# Patient Record
Sex: MALE | Race: WHITE | Employment: FULL TIME
[De-identification: names, ages, dates, MRNs, and addresses within clinical notes are randomized per-mention and may not be internally consistent; named-entity substitution may affect disease eponyms.]

---

## 2024-03-08 ENCOUNTER — OFFICE VISIT (OUTPATIENT)
Facility: CLINIC | Age: 29
End: 2024-03-08
Payer: COMMERCIAL

## 2024-03-08 ENCOUNTER — HOSPITAL ENCOUNTER (OUTPATIENT)
Facility: HOSPITAL | Age: 29
Setting detail: SPECIMEN
End: 2024-03-08
Payer: COMMERCIAL

## 2024-03-08 VITALS
OXYGEN SATURATION: 99 % | WEIGHT: 152.8 LBS | SYSTOLIC BLOOD PRESSURE: 98 MMHG | HEART RATE: 74 BPM | HEIGHT: 67 IN | TEMPERATURE: 97.7 F | DIASTOLIC BLOOD PRESSURE: 65 MMHG | RESPIRATION RATE: 15 BRPM | BODY MASS INDEX: 23.98 KG/M2

## 2024-03-08 DIAGNOSIS — Z76.89 ENCOUNTER TO ESTABLISH CARE: Primary | ICD-10-CM

## 2024-03-08 DIAGNOSIS — Z13.220 SCREENING CHOLESTEROL LEVEL: ICD-10-CM

## 2024-03-08 DIAGNOSIS — K58.2 IRRITABLE BOWEL SYNDROME WITH BOTH CONSTIPATION AND DIARRHEA: ICD-10-CM

## 2024-03-08 DIAGNOSIS — G44.009 CLUSTER HEADACHE, NOT INTRACTABLE, UNSPECIFIED CHRONICITY PATTERN: ICD-10-CM

## 2024-03-08 DIAGNOSIS — K21.9 GASTROESOPHAGEAL REFLUX DISEASE WITHOUT ESOPHAGITIS: ICD-10-CM

## 2024-03-08 DIAGNOSIS — Z11.59 ENCOUNTER FOR HEPATITIS C SCREENING TEST FOR LOW RISK PATIENT: ICD-10-CM

## 2024-03-08 DIAGNOSIS — Z11.3 ROUTINE SCREENING FOR STI (SEXUALLY TRANSMITTED INFECTION): ICD-10-CM

## 2024-03-08 DIAGNOSIS — Z13.1 DIABETES MELLITUS SCREENING: ICD-10-CM

## 2024-03-08 DIAGNOSIS — Z23 NEED FOR VACCINATION: ICD-10-CM

## 2024-03-08 DIAGNOSIS — Z76.89 ENCOUNTER TO ESTABLISH CARE: ICD-10-CM

## 2024-03-08 LAB
ALBUMIN SERPL-MCNC: 4.1 G/DL (ref 3.4–5)
ALBUMIN/GLOB SERPL: 1.1 (ref 0.8–1.7)
ALP SERPL-CCNC: 60 U/L (ref 45–117)
ALT SERPL-CCNC: 20 U/L (ref 16–61)
ANION GAP SERPL CALC-SCNC: 4 MMOL/L (ref 3–18)
AST SERPL-CCNC: 10 U/L (ref 10–38)
BASOPHILS # BLD: 0 K/UL (ref 0–0.1)
BASOPHILS NFR BLD: 1 % (ref 0–2)
BILIRUB SERPL-MCNC: 0.6 MG/DL (ref 0.2–1)
BUN SERPL-MCNC: 8 MG/DL (ref 7–18)
BUN/CREAT SERPL: 9 (ref 12–20)
CALCIUM SERPL-MCNC: 9.7 MG/DL (ref 8.5–10.1)
CHLORIDE SERPL-SCNC: 109 MMOL/L (ref 100–111)
CHOLEST SERPL-MCNC: 193 MG/DL
CO2 SERPL-SCNC: 29 MMOL/L (ref 21–32)
CREAT SERPL-MCNC: 0.89 MG/DL (ref 0.6–1.3)
DIFFERENTIAL METHOD BLD: ABNORMAL
EOSINOPHIL # BLD: 0.2 K/UL (ref 0–0.4)
EOSINOPHIL NFR BLD: 4 % (ref 0–5)
ERYTHROCYTE [DISTWIDTH] IN BLOOD BY AUTOMATED COUNT: 12.8 % (ref 11.6–14.5)
GLOBULIN SER CALC-MCNC: 3.7 G/DL (ref 2–4)
GLUCOSE SERPL-MCNC: 94 MG/DL (ref 74–99)
HBA1C MFR BLD: 4.9 % (ref 4.2–5.6)
HCT VFR BLD AUTO: 48.6 % (ref 36–48)
HDLC SERPL-MCNC: 66 MG/DL (ref 40–60)
HDLC SERPL: 2.9 (ref 0–5)
HGB BLD-MCNC: 15.6 G/DL (ref 13–16)
IMM GRANULOCYTES # BLD AUTO: 0 K/UL (ref 0–0.04)
IMM GRANULOCYTES NFR BLD AUTO: 0 % (ref 0–0.5)
LDLC SERPL CALC-MCNC: 108.6 MG/DL (ref 0–100)
LIPID PANEL: ABNORMAL
LYMPHOCYTES # BLD: 2.3 K/UL (ref 0.9–3.6)
LYMPHOCYTES NFR BLD: 39 % (ref 21–52)
MCH RBC QN AUTO: 30.8 PG (ref 24–34)
MCHC RBC AUTO-ENTMCNC: 32.1 G/DL (ref 31–37)
MCV RBC AUTO: 95.9 FL (ref 78–100)
MONOCYTES # BLD: 0.4 K/UL (ref 0.05–1.2)
MONOCYTES NFR BLD: 6 % (ref 3–10)
NEUTS SEG # BLD: 3 K/UL (ref 1.8–8)
NEUTS SEG NFR BLD: 51 % (ref 40–73)
NRBC # BLD: 0 K/UL (ref 0–0.01)
NRBC BLD-RTO: 0 PER 100 WBC
PLATELET # BLD AUTO: 308 K/UL (ref 135–420)
PMV BLD AUTO: 10.8 FL (ref 9.2–11.8)
POTASSIUM SERPL-SCNC: 4.2 MMOL/L (ref 3.5–5.5)
PROT SERPL-MCNC: 7.8 G/DL (ref 6.4–8.2)
RBC # BLD AUTO: 5.07 M/UL (ref 4.35–5.65)
RPR SER QL: NONREACTIVE
SODIUM SERPL-SCNC: 142 MMOL/L (ref 136–145)
TRIGL SERPL-MCNC: 92 MG/DL
VLDLC SERPL CALC-MCNC: 18.4 MG/DL
WBC # BLD AUTO: 6 K/UL (ref 4.6–13.2)

## 2024-03-08 PROCEDURE — 85025 COMPLETE CBC W/AUTO DIFF WBC: CPT

## 2024-03-08 PROCEDURE — 80061 LIPID PANEL: CPT

## 2024-03-08 PROCEDURE — 87491 CHLMYD TRACH DNA AMP PROBE: CPT

## 2024-03-08 PROCEDURE — 90471 IMMUNIZATION ADMIN: CPT | Performed by: STUDENT IN AN ORGANIZED HEALTH CARE EDUCATION/TRAINING PROGRAM

## 2024-03-08 PROCEDURE — 99204 OFFICE O/P NEW MOD 45 MIN: CPT | Performed by: STUDENT IN AN ORGANIZED HEALTH CARE EDUCATION/TRAINING PROGRAM

## 2024-03-08 PROCEDURE — 36415 COLL VENOUS BLD VENIPUNCTURE: CPT

## 2024-03-08 PROCEDURE — 87591 N.GONORRHOEAE DNA AMP PROB: CPT

## 2024-03-08 PROCEDURE — 90715 TDAP VACCINE 7 YRS/> IM: CPT | Performed by: STUDENT IN AN ORGANIZED HEALTH CARE EDUCATION/TRAINING PROGRAM

## 2024-03-08 PROCEDURE — 87661 TRICHOMONAS VAGINALIS AMPLIF: CPT

## 2024-03-08 PROCEDURE — 80053 COMPREHEN METABOLIC PANEL: CPT

## 2024-03-08 PROCEDURE — 86803 HEPATITIS C AB TEST: CPT

## 2024-03-08 PROCEDURE — 83036 HEMOGLOBIN GLYCOSYLATED A1C: CPT

## 2024-03-08 PROCEDURE — 87389 HIV-1 AG W/HIV-1&-2 AB AG IA: CPT

## 2024-03-08 PROCEDURE — 86592 SYPHILIS TEST NON-TREP QUAL: CPT

## 2024-03-08 RX ORDER — FAMOTIDINE 20 MG/1
20 TABLET, FILM COATED ORAL 2 TIMES DAILY
Qty: 60 TABLET | Refills: 2 | Status: SHIPPED | OUTPATIENT
Start: 2024-03-08

## 2024-03-08 RX ORDER — OMEPRAZOLE 40 MG/1
40 CAPSULE, DELAYED RELEASE ORAL
Qty: 30 CAPSULE | Refills: 3 | Status: SHIPPED | OUTPATIENT
Start: 2024-03-08

## 2024-03-08 SDOH — ECONOMIC STABILITY: FOOD INSECURITY: WITHIN THE PAST 12 MONTHS, YOU WORRIED THAT YOUR FOOD WOULD RUN OUT BEFORE YOU GOT MONEY TO BUY MORE.: NEVER TRUE

## 2024-03-08 SDOH — ECONOMIC STABILITY: INCOME INSECURITY: HOW HARD IS IT FOR YOU TO PAY FOR THE VERY BASICS LIKE FOOD, HOUSING, MEDICAL CARE, AND HEATING?: NOT HARD AT ALL

## 2024-03-08 SDOH — ECONOMIC STABILITY: HOUSING INSECURITY
IN THE LAST 12 MONTHS, WAS THERE A TIME WHEN YOU DID NOT HAVE A STEADY PLACE TO SLEEP OR SLEPT IN A SHELTER (INCLUDING NOW)?: NO

## 2024-03-08 SDOH — ECONOMIC STABILITY: FOOD INSECURITY: WITHIN THE PAST 12 MONTHS, THE FOOD YOU BOUGHT JUST DIDN'T LAST AND YOU DIDN'T HAVE MONEY TO GET MORE.: NEVER TRUE

## 2024-03-08 ASSESSMENT — ENCOUNTER SYMPTOMS
VOMITING: 0
ABDOMINAL PAIN: 0
NAUSEA: 0
SHORTNESS OF BREATH: 0

## 2024-03-08 ASSESSMENT — PATIENT HEALTH QUESTIONNAIRE - PHQ9
2. FEELING DOWN, DEPRESSED OR HOPELESS: 0
SUM OF ALL RESPONSES TO PHQ QUESTIONS 1-9: 0
1. LITTLE INTEREST OR PLEASURE IN DOING THINGS: 0
SUM OF ALL RESPONSES TO PHQ QUESTIONS 1-9: 0
SUM OF ALL RESPONSES TO PHQ9 QUESTIONS 1 & 2: 0
SUM OF ALL RESPONSES TO PHQ QUESTIONS 1-9: 0
SUM OF ALL RESPONSES TO PHQ QUESTIONS 1-9: 0

## 2024-03-08 NOTE — PROGRESS NOTES
History of Present Illness  Brian Bunn is a 28 y.o. male who presents today for management of    Chief Complaint   Patient presents with    New Patient     CC: new patient here for healthcare maintenance    -Patient is here to establish care.   -Previous PCP: none  -Works as   -He lives at home with wife and son   -Pt reports good support system with family/friends and feels safe at home.    HA:  -reports sharp pain behind eye, occurs on one side of the face  -watery eye and congestion on that side  -comes and goes  -started 8 years ago  -HA lasts max of 5 hrs  -Sometimes daily, or every other day  -aspirin helps somewhat   -frequency hs increased over the years    Heartburn:  -Tums stopped working  -Now taking pepcid which helps somewhat    Healthcare maintenance:  Immunizations:  Flu vacc: overdue  TDaP vacc: overdue  COVID vacc: overdue for booster  Sexual health: Pt would like STI screening  Mood: Reports good mood overall      3/8/2024    11:08 AM   PHQ-9    Little interest or pleasure in doing things 0   Feeling down, depressed, or hopeless 0   PHQ-2 Score 0   PHQ-9 Total Score 0     Past Medical History  No past medical history on file.   Surgical History  No past surgical history on file.   Current Medications  No current outpatient medications on file.     No current facility-administered medications for this visit.     Allergies/Drug Reactions  No Known Allergies   Family History  Family History   Problem Relation Age of Onset    Hypertension Father     Epilepsy Brother     Diabetes Maternal Grandfather       Social History  Social History     Tobacco Use    Smoking status: Never    Smokeless tobacco: Never   Vaping Use    Vaping Use: Some days    Substances: THC    Devices: Disposable   Substance Use Topics    Alcohol use: Yes     Comment: occasionaly    Drug use: Never      Health Maintenance   Topic Date Due    COVID-19 Vaccine (1) Never done    Depression Screen  Never done    HIV

## 2024-03-08 NOTE — PROGRESS NOTES
Brian Bunn is a 28 y.o. year old male who presents today for   Chief Complaint   Patient presents with    New Patient       Is someone accompanying this pt? no    Is the patient using any DME equipment during OV? no    Depression Screening:       3/8/2024    11:08 AM   PHQ-9 Questionaire   Little interest or pleasure in doing things 0   Feeling down, depressed, or hopeless 0   PHQ-9 Total Score 0       Abuse Screening:      3/8/2024    11:00 AM   AMB Abuse Screening   Do you ever feel afraid of your partner? N   Are you in a relationship with someone who physically or mentally threatens you? N   Is it safe for you to go home? Y       Learning Assessment:  No question data found.    Fall Risk:       No data to display                    Coordination of Care:   1. \"Have you been to the ER, urgent care clinic since your last visit?  Hospitalized since your last visit?\" no    2. \"Have you seen or consulted any other health care providers outside of the [facility] since your last visit?\" no    3. For patients aged 45-75: Has the patient had a colonoscopy / FIT/ Cologuard? no    If the patient is female:    4. For patients aged 40-74: Has the patient had a mammogram within the past 2 years? no    5. For patients aged 21-65: Has the patient had a pap smear? no    Health Maintenance: reviewed and discussed and ordered per Provider.    Health Maintenance Due   Topic Date Due    COVID-19 Vaccine (1) Never done    Depression Screen  Never done    HIV screen  Never done    Hepatitis C screen  Never done    DTaP/Tdap/Td vaccine (7 - Td or Tdap) 12/13/2015    Flu vaccine (1) 08/01/2023        - Osmar Posadas/MA  [facility]  Phone: [phone]  Fax: [phone]

## 2024-03-11 LAB
C TRACH RRNA SPEC QL NAA+PROBE: NEGATIVE
HCV AB SER IA-ACNC: 0.04 INDEX
HCV AB SERPL QL IA: NEGATIVE
HEPATITIS C COMMENT: NORMAL
HIV 1+2 AB+HIV1 P24 AG SERPL QL IA: NONREACTIVE
HIV 1/2 RESULT COMMENT: NORMAL
N GONORRHOEA RRNA SPEC QL NAA+PROBE: NEGATIVE
SPECIMEN SOURCE: NORMAL
T VAGINALIS RRNA SPEC QL NAA+PROBE: NEGATIVE

## 2024-03-18 ENCOUNTER — OFFICE VISIT (OUTPATIENT)
Facility: CLINIC | Age: 29
End: 2024-03-18
Payer: COMMERCIAL

## 2024-03-18 VITALS
SYSTOLIC BLOOD PRESSURE: 105 MMHG | BODY MASS INDEX: 24.14 KG/M2 | WEIGHT: 153.8 LBS | OXYGEN SATURATION: 98 % | RESPIRATION RATE: 15 BRPM | DIASTOLIC BLOOD PRESSURE: 72 MMHG | HEART RATE: 76 BPM | HEIGHT: 67 IN | TEMPERATURE: 97.8 F

## 2024-03-18 DIAGNOSIS — S83.104S DISLOCATION OF RIGHT KNEE, SEQUELA: Primary | ICD-10-CM

## 2024-03-18 PROCEDURE — 99213 OFFICE O/P EST LOW 20 MIN: CPT | Performed by: STUDENT IN AN ORGANIZED HEALTH CARE EDUCATION/TRAINING PROGRAM

## 2024-03-18 ASSESSMENT — PATIENT HEALTH QUESTIONNAIRE - PHQ9
SUM OF ALL RESPONSES TO PHQ9 QUESTIONS 1 & 2: 0
2. FEELING DOWN, DEPRESSED OR HOPELESS: NOT AT ALL
SUM OF ALL RESPONSES TO PHQ QUESTIONS 1-9: 0
1. LITTLE INTEREST OR PLEASURE IN DOING THINGS: NOT AT ALL
SUM OF ALL RESPONSES TO PHQ QUESTIONS 1-9: 0

## 2024-03-18 ASSESSMENT — ENCOUNTER SYMPTOMS: SHORTNESS OF BREATH: 0

## 2024-03-18 NOTE — PROGRESS NOTES
Brian Bunn is a 28 y.o. year old male who presents today for   Chief Complaint   Patient presents with    Follow-up       Is someone accompanying this pt? no    Is the patient using any DME equipment during OV? no    Depression Screening:       3/18/2024     9:49 AM 3/8/2024    11:08 AM   PHQ-9 Questionaire   Little interest or pleasure in doing things 0 0   Feeling down, depressed, or hopeless 0 0   PHQ-9 Total Score 0 0       Abuse Screening:      3/8/2024    11:00 AM   AMB Abuse Screening   Do you ever feel afraid of your partner? N   Are you in a relationship with someone who physically or mentally threatens you? N   Is it safe for you to go home? Y       Learning Assessment:  No question data found.    Fall Risk:       No data to display                    Coordination of Care:   1. \"Have you been to the ER, urgent care clinic since your last visit?  Hospitalized since your last visit?\" yes    2. \"Have you seen or consulted any other health care providers outside of the [facility] since your last visit?\" no    3. For patients aged 45-75: Has the patient had a colonoscopy / FIT/ Cologuard? no    If the patient is female:    4. For patients aged 40-74: Has the patient had a mammogram within the past 2 years? no    5. For patients aged 21-65: Has the patient had a pap smear? no    Health Maintenance: reviewed and discussed and ordered per Provider.    Health Maintenance Due   Topic Date Due    Flu vaccine (1) 08/01/2023    COVID-19 Vaccine (3 - 2023-24 season) 09/01/2023        - Osmar Posadas/MA  [facility]  Phone: [phone]  Fax: [phone]

## 2024-03-18 NOTE — PROGRESS NOTES
History of Present Illness  Brian Bunn is a 28 y.o. male who presents today for management of    Chief Complaint   Patient presents with    Follow-up         -pt reports R knee patellar dislocation on 3/8/24  -went to ED, dislocation reduced, pt placed in knee immobilizer, told to follow up with Ortho in 1-2 weeks  -pt reports reduction in swelling and decreased ROM (however this has slowly improved)      There is no problem list on file for this patient.     Past Medical History:   Diagnosis Date    Headache     Irritable bowel syndrome       No past surgical history on file.   Family History   Problem Relation Age of Onset    Hypertension Father     Epilepsy Brother     Diabetes Maternal Grandfather      Social History     Socioeconomic History    Marital status:      Spouse name: Not on file    Number of children: Not on file    Years of education: Not on file    Highest education level: Not on file   Occupational History    Not on file   Tobacco Use    Smoking status: Never    Smokeless tobacco: Never   Vaping Use    Vaping Use: Some days    Substances: THC    Devices: Disposable   Substance and Sexual Activity    Alcohol use: Yes     Comment: occasionaly    Drug use: Never    Sexual activity: Yes     Partners: Female   Other Topics Concern    Not on file   Social History Narrative    Not on file     Social Determinants of Health     Financial Resource Strain: Low Risk  (3/8/2024)    Overall Financial Resource Strain (CARDIA)     Difficulty of Paying Living Expenses: Not hard at all   Food Insecurity: No Food Insecurity (3/8/2024)    Hunger Vital Sign     Worried About Running Out of Food in the Last Year: Never true     Ran Out of Food in the Last Year: Never true   Transportation Needs: Unknown (3/8/2024)    PRAPARE - Transportation     Lack of Transportation (Medical): Not on file     Lack of Transportation (Non-Medical): No   Physical Activity: Not on file   Stress: Not on file   Social

## 2024-04-08 ENCOUNTER — HOSPITAL ENCOUNTER (OUTPATIENT)
Facility: HOSPITAL | Age: 29
Setting detail: RECURRING SERIES
Discharge: HOME | End: 2024-04-11
Payer: COMMERCIAL

## 2024-04-08 PROCEDURE — 97016 VASOPNEUMATIC DEVICE THERAPY: CPT

## 2024-04-08 PROCEDURE — 97110 THERAPEUTIC EXERCISES: CPT

## 2024-04-08 PROCEDURE — 97161 PT EVAL LOW COMPLEX 20 MIN: CPT

## 2024-04-08 NOTE — PROGRESS NOTES
[facility]  [address] [place] [id number] Phone: [phone] Fax   Plan of Care / Statement of Necessity for Physical Therapy Services     Patient Name: Brian Bunn : 1995   Medical   Diagnosis: *Right knee pain [M25.561] Treatment Diagnosis: M25.561  RIGHT KNEE PAIN     Onset Date: 2024 Payor:  Payor: NICHOLAS / Plan: [facility] [place] / Product Type: *No Product type* /    Referral Source: Sarabjit Emery MD Start of Care (SOC): 2024   Prior Hospitalization: See medical history Provider #: [id number]   Prior Level of Function: Functional I, no hx of any ortho or PMH    Comorbidities: See assessment section below      Assessment / key information:  Pt is a 28 y.o. year old male who presents with co R medial knee pain and swelling starting 2024 after dislocation while dancing on 2024. Patient wears brace when in the community. Past treatments/ imaging have included visit to ER , relocated patella and referred to ortho .  Pain levels range from 2/10 to 7/10 and managed with OTC ibuprofen and icing as needed.  Current social situation include lives in 2 story home with wife and 1 yo son  . PMH insignificant  Reported functional deficits include: descending stairs, work duties- getting into truck, heavy lifting walking down incline, rec activities- riding bike, golf , marital arts Positive objective assessment as follows:   Gait:  antalgic    ROM :  Knee 1 - 120 (painful full extension and end range flexion)    Strength :  Hip: flexion 3, ABD 4 extension 4  Knee: flexion 4, extension 3, quad lag +    Flexibility:   Hamstrings:  90/90 HS test + R   Quadriceps: Bronwyn Test +  100 deg   Gastroc:   tightness reported     Palpation: medial knee tenderness     Patella:  Patellar Mobility hypo        Girth Measurements:     Cm at joint line   Left 36   Right  38     Balance:  SLS - 30 sec B, 3/10 pain and increased trunk movement R LE

## 2024-04-08 NOTE — PROGRESS NOTES
PT KNEE / HIP EVAL AND TREATMENT    Patient Name: Brian Bunn  Date:2024  : 1995  [x]  Patient  Verified  Payor: NICHOLAS / Plan: [facility] [place] / Product Type: *No Product type* /    In time:910  Out time:946  Total Treatment Time (min): 36  Total Timed Codes (min): 8  1:1 Treatment Time ( only): 26   Visit #: 1 of     Treatment Area: Right knee pain [M25.561]    SUBJECTIVE  Pain Level (0-10 scale): (C): 3 (B): 2 (W):  7  Any medication changes, allergies to medications, diagnosis change, or new procedure performed: see summary sheet for update  Subjective functional status/changes:  CHIEF COMPLAINT: Patient presents with co R medial knee pain and swelling starting 2024 after dislocation while dancing on 2024. Patient wears brace when in the community. Past treatments/ imaging have included visit to ER , relocated patella and referred to ortho .  Pain levels range from 2/10 to 7/10 and managed with OTC ibuprofen and icing as needed.  Current social situation include lives in 2 story home with wife and 3 yo son  . PMH insignificant  Reported functional deficits include: descending stairs, work duties- getting into truck, heavy lifting walking down incline, rec activities- riding bike, golf , [employer] arts     OBJECTIVE  Posture: WNL   Gait:  antalgic    ROM :  Hip WNL   Knee 1 - 120 (painful full extension and end range flexion)  Ankle WNL     Strength :  Hip: flexion 3, ABD 4 extension 4  Knee: flexion 4, extension 3, quad lag +    Flexibility:   Hamstrings:  90/90 HS test + R   Quadriceps: Bronwyn Test +  100 deg   Gastroc:   tightness reported     Palpation: medial knee tenderness     Patella:  Patellar Positioning (Static) WNL   Patellar Tracking WNL      Patellar Mobility hypo        Girth Measurements:     Cm at joint line   Left 36   Right  38     Balance:  SLS - 30 sec B, 3/10 pain and increased trunk movement R LE             Other tests/comments:  Squat : 45 deg knee and hip bend

## 2024-04-11 ENCOUNTER — HOSPITAL ENCOUNTER (OUTPATIENT)
Facility: HOSPITAL | Age: 29
Setting detail: RECURRING SERIES
Discharge: HOME | End: 2024-04-14
Payer: COMMERCIAL

## 2024-04-11 PROCEDURE — 97112 NEUROMUSCULAR REEDUCATION: CPT

## 2024-04-11 PROCEDURE — 97110 THERAPEUTIC EXERCISES: CPT

## 2024-04-11 PROCEDURE — 97140 MANUAL THERAPY 1/> REGIONS: CPT

## 2024-04-11 PROCEDURE — 97016 VASOPNEUMATIC DEVICE THERAPY: CPT

## 2024-04-11 NOTE — PROGRESS NOTES
deficits, analyze and address strength deficits, analyze and address soft tissue restrictions, analyze and cue for proper movement patterns, analyze and modify for postural abnormalities, analyze and address imbalance/dizziness, and instruct in home and community integration to address functional deficits and attain remaining goals.    Progress toward goals / Updated goals:  []  See Progress Note/Recertification    Short Term Goals: To be accomplished in  1  weeks:  1.  Pt will be independent and compliant with HEP  Status at last assessment :HEP est at initial eval and will be progressed as needed.   Current: notes compliance (4/11/24)  Long Term Goals: To be accomplished in  8-12  treatments:  1.  Patient will increase FOTO score to 71/100 for indications of increased functional mobility.   Status at IE 40  2.  Patient will demo AROM knee flexion to 125 for improved joint mobility with transfers   Status at   Current; addressing with pain-free joint mobs and PROm flexion with light PT OP (4/11/24)  3.  Patient will demo negative quad lag with SLR for improved quad recruitment to decrease antalgic gait   Status at IE +  Current: addressing with quad setting in WB and NWB positions (4/11/24)  4.  Patient will demo 4/5 knee extension strength for improved stability with SL ADLs    Status at IE 3    Next PN/ RC due 5/6/24  Auth due (visit number/ date) ROSEANN    PLAN  - Continue Plan of Care  - Upgrade activities as tolerated    Betsy England PT    4/11/2024    8:30 AM    Future Appointments   Date Time Provider Department Center   4/11/2024  9:00 AM Betsy England, PT [facility]   4/17/2024  2:00 PM [facility]   4/19/2024 11:40 AM Sarah Humphreys, PT [facility]   4/24/2024  9:00 AM [facility]   4/26/2024 12:20 PM Sarah Humphreys, PT [facility]   5/7/2024  9:00 AM Benedicto Nunes, NUPUR [facility]   5/8/2024 10:00 AM Peggy Solis MD [facility]   5/10/2024  9:00 AM Benedicto Nunes,

## 2024-04-17 ENCOUNTER — HOSPITAL ENCOUNTER (OUTPATIENT)
Facility: HOSPITAL | Age: 29
Setting detail: RECURRING SERIES
Discharge: HOME | End: 2024-04-20
Payer: COMMERCIAL

## 2024-04-17 PROCEDURE — 97140 MANUAL THERAPY 1/> REGIONS: CPT

## 2024-04-17 PROCEDURE — 97110 THERAPEUTIC EXERCISES: CPT

## 2024-04-17 PROCEDURE — 97112 NEUROMUSCULAR REEDUCATION: CPT

## 2024-04-17 PROCEDURE — 97016 VASOPNEUMATIC DEVICE THERAPY: CPT

## 2024-04-17 NOTE — PROGRESS NOTES
PHYSICAL / OCCUPATIONAL THERAPY - DAILY TREATMENT NOTE    Patient Name: Brian Bunn    Date: 2024    : 1995  Insurance: Payor: NICHOLAS / Plan: [facility] / Product Type: *No Product type* /      Patient  verified Yes     Visit #   Current / Total 3 8-12   Time   In / Out 2:00 3:10   Pain   In / Out 1 0   Subjective Functional Status/Changes: Pt reports his knee is doing better overall.      TREATMENT AREA =  Right knee pain [M25.561]    OBJECTIVE    Modalities Rationale:     decrease edema, decrease inflammation, and decrease pain to improve patient's ability to progress to PLOF and address remaining functional goals.    10 min [x]  Vasopneumatic Device, press/temp: Low/low   If using vaso (only need to measure limb vaso being performed on)      pre-treatment girth : 37.5 cm (with pants donned)      post-treatment girth : 36 cm      measured at (landmark location) :   mid patella    Skin assessment post-treatment:   Intact      Therapeutic Procedures:    Tx Min Billable or 1:1 Min (if diff from Tx Min) Procedure, Rationale, Specifics   25  54642 Therapeutic Exercise (timed):  increase ROM, strength, coordination, balance, and proprioception to improve patient's ability to progress to PLOF and address remaining functional goals. (see flow sheet as applicable)     Details if applicable:  exercises and ROM measurements   20  38640 Neuromuscular Re-Education (timed):  improve balance, coordination, kinesthetic sense, posture, core stability and proprioception to improve patient's ability to develop conscious control of individual muscles and awareness of position of extremities in order to progress to PLOF and address remaining functional goals. (see flow sheet as applicable)     Details if applicable:     15  56232 Manual Therapy (timed):  decrease pain and increase ROM to improve patient's ability to progress to PLOF and address remaining functional goals.  The manual therapy interventions were

## 2024-04-19 ENCOUNTER — HOSPITAL ENCOUNTER (OUTPATIENT)
Facility: HOSPITAL | Age: 29
Setting detail: RECURRING SERIES
Discharge: HOME | End: 2024-04-22
Payer: COMMERCIAL

## 2024-04-19 PROCEDURE — 97016 VASOPNEUMATIC DEVICE THERAPY: CPT

## 2024-04-19 PROCEDURE — 97140 MANUAL THERAPY 1/> REGIONS: CPT

## 2024-04-19 PROCEDURE — 97110 THERAPEUTIC EXERCISES: CPT

## 2024-04-19 PROCEDURE — 97112 NEUROMUSCULAR REEDUCATION: CPT

## 2024-04-19 NOTE — PROGRESS NOTES
PHYSICAL / OCCUPATIONAL THERAPY - DAILY TREATMENT NOTE    Patient Name: Brian Bunn    Date: 2024    : 1995  Insurance: Payor: NICHOLAS / Plan: [facility] [place] / Product Type: *No Product type* /      Patient  verified Yes     Visit #   Current / Total 4 8-12   Time   In / Out 11:40 12:35   Pain   In / Out 0 0   Subjective Functional Status/Changes: \"The stuff I'm doing here and at home is making a big difference.\"     TREATMENT AREA =  Right knee pain [M25.561]    OBJECTIVE    Modalities Rationale:     decrease edema, decrease inflammation, and decrease pain to improve patient's ability to progress to PLOF and address remaining functional goals.    10 min [x]  Vasopneumatic Device, press/temp: Low/low   If using vaso (only need to measure limb vaso being performed on)      pre-treatment girth : 37.5 cm (with pants donned)      post-treatment girth : 37.4 cm      measured at (landmark location) :   mid patella    Skin assessment post-treatment:   Intact      Therapeutic Procedures:    Tx Min Billable or 1:1 Min (if diff from Tx Min) Procedure, Rationale, Specifics   17 17 55265 Therapeutic Exercise (timed):  increase ROM, strength, coordination, balance, and proprioception to improve patient's ability to progress to PLOF and address remaining functional goals. (see flow sheet as applicable)     Details if applicable:  right knee strengthening and ROM     20 20 41958 Neuromuscular Re-Education (timed):  improve balance, coordination, kinesthetic sense, posture, core stability and proprioception to improve patient's ability to develop conscious control of individual muscles and awareness of position of extremities in order to progress to PLOF and address remaining functional goals. (see flow sheet as applicable)     Details if applicable:  right quad re-ed     8 8 38974 Manual Therapy (timed):  decrease pain and increase ROM to improve patient's ability to progress to PLOF and address remaining

## 2024-04-24 ENCOUNTER — HOSPITAL ENCOUNTER (OUTPATIENT)
Facility: HOSPITAL | Age: 29
Setting detail: RECURRING SERIES
Discharge: HOME | End: 2024-04-27
Payer: COMMERCIAL

## 2024-04-24 PROCEDURE — 97112 NEUROMUSCULAR REEDUCATION: CPT

## 2024-04-24 PROCEDURE — 97110 THERAPEUTIC EXERCISES: CPT

## 2024-04-24 PROCEDURE — 97016 VASOPNEUMATIC DEVICE THERAPY: CPT

## 2024-04-25 ENCOUNTER — HOSPITAL ENCOUNTER (OUTPATIENT)
Facility: HOSPITAL | Age: 29
Setting detail: RECURRING SERIES
Discharge: HOME | End: 2024-04-28
Payer: COMMERCIAL

## 2024-04-25 PROCEDURE — 97110 THERAPEUTIC EXERCISES: CPT

## 2024-04-25 PROCEDURE — 97140 MANUAL THERAPY 1/> REGIONS: CPT

## 2024-04-25 PROCEDURE — 97112 NEUROMUSCULAR REEDUCATION: CPT

## 2024-04-25 PROCEDURE — 97016 VASOPNEUMATIC DEVICE THERAPY: CPT

## 2024-04-25 NOTE — PROGRESS NOTES
PHYSICAL / OCCUPATIONAL THERAPY - DAILY TREATMENT NOTE    Patient Name: Brian Bunn    Date: 2024    : 1995  Insurance: Payor: NICHOLAS / Plan: [facility] [place] / Product Type: *No Product type* /      Patient  verified Yes     Visit #   Current / Total 6 8-12   Time   In / Out 7:40 am 8:37 am   Pain   In / Out 0/10 0/10   Subjective Functional Status/Changes: Patient reports he has been able to walk more normally. Patient states he has continued pain after driving and when      TREATMENT AREA =  Right knee pain [M25.561]    OBJECTIVE    Modalities Rationale:     decrease edema, decrease inflammation, and decrease pain to improve patient's ability to progress to PLOF and address remaining functional goals.    10 min [x]  Vasopneumatic Device, press/temp: Low/low   If using vaso (only need to measure limb vaso being performed on)      pre-treatment girth: 38 cm (with pants donned)      post-treatment girth: 38 cm (with pants donned)      measured at (landmark location): mid patella    Skin assessment post-treatment:   Intact      Therapeutic Procedures:    Tx Min Billable or 1:1 Min (if diff from Tx Min) Procedure, Rationale, Specifics   21  53519 Therapeutic Exercise (timed):  increase ROM, strength, coordination, balance, and proprioception to improve patient's ability to progress to PLOF and address remaining functional goals. (see flow sheet as applicable)     Details if applicable:       18  44536 Neuromuscular Re-Education (timed):  improve balance, coordination, kinesthetic sense, posture, core stability and proprioception to improve patient's ability to develop conscious control of individual muscles and awareness of position of extremities in order to progress to PLOF and address remaining functional goals. (see flow sheet as applicable)     Details if applicable: modified quadriceps set with SLR to quadriceps set only     8  70440 Manual Therapy (timed):  increase ROM and increase tissue

## 2024-04-26 ENCOUNTER — APPOINTMENT (OUTPATIENT)
Facility: HOSPITAL | Age: 29
End: 2024-04-26
Payer: COMMERCIAL

## 2024-04-29 ENCOUNTER — APPOINTMENT (OUTPATIENT)
Facility: HOSPITAL | Age: 29
End: 2024-04-29
Payer: COMMERCIAL

## 2024-05-07 ENCOUNTER — HOSPITAL ENCOUNTER (OUTPATIENT)
Facility: HOSPITAL | Age: 29
Setting detail: RECURRING SERIES
Discharge: HOME | End: 2024-05-10
Payer: COMMERCIAL

## 2024-05-07 PROCEDURE — 97110 THERAPEUTIC EXERCISES: CPT

## 2024-05-07 PROCEDURE — 97140 MANUAL THERAPY 1/> REGIONS: CPT

## 2024-05-07 PROCEDURE — 97112 NEUROMUSCULAR REEDUCATION: CPT

## 2024-05-07 NOTE — PROGRESS NOTES
[facility] [place]   [address] [place] [address]  Phone: [phone] Fax: [phone]  PROGRESS NOTE  Patient Name: Brian Bunn : 1995   Treatment/Medical Diagnosis: Right knee pain [M25.561]   Referral Source:  Sarabjit Staton MD  Payor: NICHOLAS / Plan: [facility] [place] / Product Type: *No Product type* /      Date of Initial Visit: 24 Attended Visits: 7 Missed Visits: 1     SUMMARY OF TREATMENT  Patient is a 28 year old male who presents with c/o (R) medial knee pain and swelling starting 2024 after dislocation while dancing on 2024. Patient wears brace when in the community. Treatment has consisted of the followin Therapeutic Exercise, 00251 Neuromuscular Re-Education, 54686 Manual Therapy, 55158 Therapeutic Activity, 21513 Self Care/Home Management, 60016 Vasopneumatic Device.    CURRENT STATUS  Patient has attended 7 our of 8 sessions from 24-24, making good progress at this time. Objectively, patient demonstrates increased knee mobility and strength likely contributing to improvement in gait quality, squatting, and stair negotiation. Assessment as follows:    FOTO score: 63/100  Subjective improvement of 85% in symptoms since IE reported.  Improvements: improved ability to bend the right knee, able to straighten the right knee fully, moving furniture, stairs, squatting  Deficits: dancing, kneeling (patellar pain), lateral movement, increased time when having to do certain movements such as when standing from a half-kneeling position     ROM:     Hip WNL   Knee 0 - 140 (painful end range flexion)  Ankle WNL      Strength :  Hip: flexion 4+/5, ABD 4/5, extension 4/5  Knee: flexion 4/5, extension 4/5  Right distal thigh circumference 1.2 cm decreased in comparison to left distal thigh circumference, measured at one inch above base of patella     Flexibility:   Hamstrings:  90/90 HS test + minimally (R) 
totals to left)  8-22 min = 1 unit; 23-37 min = 2 units; 38-52 min = 3 units; 53-67 min = 4 units; 68-82 min = 5 units   Total Total     [x]  Patient Education billed concurrently with other procedures   [] Review HEP    [] Progressed/Changed HEP, detail:    [] Other detail:       Objective Information/Functional Measures/Assessment    FOTO score: 63/100  Subjective improvement of 85% in symptoms since IE reported.  Improvements: improved ability to bend the right knee, able to straighten the right knee fully, moving furniture, stairs, squatting  Deficits: dancing, kneeling (patellar pain), lateral movement, increased time when having to do certain movements such as when standing from a half-kneeling position    ROM:    Hip WNL   Knee 0 - 140 (painful end range flexion)  Ankle WNL      Strength :  Hip: flexion 4+/5, ABD 4/5, extension 4/5  Knee: flexion 4/5, extension 4/5  Right distal thigh circumference 1.2 cm decreased in comparison to left distal thigh circumference, measured at one inch above base of patella     Flexibility:   Hamstrings:  90/90 HS test + minimally (R)   Quadriceps: Bronwyn Test +  110 deg   Gastroc: 8 degrees bilaterally     Palpation: medial knee tenderness      Patella:  Patellar Positioning (Static) WNL   Patellar Tracking mildly lateral      Patellar Mobility mildly hypomobile        Balance:  SLS - 30 sec B            Other tests/comments:  Squat: 110 deg knee and hip bend     Patient will continue to benefit from skilled PT / OT services to modify and progress therapeutic interventions, analyze and address functional mobility deficits, analyze and address ROM deficits, analyze and address strength deficits, analyze and address soft tissue restrictions, analyze and cue for proper movement patterns, analyze and modify for postural abnormalities, analyze and address imbalance/dizziness, and instruct in home and community integration to address functional deficits and attain remaining

## 2024-05-08 ENCOUNTER — OFFICE VISIT (OUTPATIENT)
Facility: CLINIC | Age: 29
End: 2024-05-08
Payer: COMMERCIAL

## 2024-05-08 VITALS
RESPIRATION RATE: 12 BRPM | SYSTOLIC BLOOD PRESSURE: 119 MMHG | TEMPERATURE: 98.1 F | WEIGHT: 149 LBS | HEART RATE: 67 BPM | HEIGHT: 67 IN | DIASTOLIC BLOOD PRESSURE: 70 MMHG | BODY MASS INDEX: 23.39 KG/M2 | OXYGEN SATURATION: 98 %

## 2024-05-08 DIAGNOSIS — G44.009 CLUSTER HEADACHE, NOT INTRACTABLE, UNSPECIFIED CHRONICITY PATTERN: ICD-10-CM

## 2024-05-08 DIAGNOSIS — S83.104S DISLOCATION OF RIGHT KNEE, SEQUELA: ICD-10-CM

## 2024-05-08 DIAGNOSIS — K21.9 GASTROESOPHAGEAL REFLUX DISEASE WITHOUT ESOPHAGITIS: Primary | ICD-10-CM

## 2024-05-08 PROCEDURE — 99214 OFFICE O/P EST MOD 30 MIN: CPT | Performed by: STUDENT IN AN ORGANIZED HEALTH CARE EDUCATION/TRAINING PROGRAM

## 2024-05-08 RX ORDER — FAMOTIDINE 20 MG/1
20 TABLET, FILM COATED ORAL 2 TIMES DAILY
Qty: 180 TABLET | Refills: 2 | Status: SHIPPED | OUTPATIENT
Start: 2024-05-08

## 2024-05-08 RX ORDER — OMEPRAZOLE 40 MG/1
40 CAPSULE, DELAYED RELEASE ORAL
Qty: 90 CAPSULE | Refills: 2 | Status: SHIPPED | OUTPATIENT
Start: 2024-05-08

## 2024-05-08 ASSESSMENT — ENCOUNTER SYMPTOMS
SHORTNESS OF BREATH: 0
ABDOMINAL PAIN: 0

## 2024-05-08 NOTE — PROGRESS NOTES
History of Present Illness  Brian Bunn is a 28 y.o. male who presents today for management of    Chief Complaint   Patient presents with    Gastroesophageal Reflux    Headache         -pt overall stable since last visit  -reports improved pain and ROM of R leg s/p patellar dislocation, last scheduled PT on Friday   -headaches have not been a problem recently  -reflux has improved on PPI and famotidine       There is no problem list on file for this patient.     Past Medical History:   Diagnosis Date    Headache     Irritable bowel syndrome       No past surgical history on file.   Family History   Problem Relation Age of Onset    Hypertension Father     Epilepsy Brother     Diabetes Maternal Grandfather      Social History     Socioeconomic History    Marital status:      Spouse name: Not on file    Number of children: Not on file    Years of education: Not on file    Highest education level: Not on file   Occupational History    Not on file   Tobacco Use    Smoking status: Never    Smokeless tobacco: Never   Vaping Use    Vaping Use: Some days    Substances: THC    Devices: Disposable   Substance and Sexual Activity    Alcohol use: Yes     Comment: occasionaly    Drug use: Never    Sexual activity: Yes     Partners: Female   Other Topics Concern    Not on file   Social History Narrative    Not on file     Social Determinants of Health     Financial Resource Strain: Low Risk  (3/8/2024)    Overall Financial Resource Strain (CARDIA)     Difficulty of Paying Living Expenses: Not hard at all   Food Insecurity: No Food Insecurity (3/8/2024)    Hunger Vital Sign     Worried About Running Out of Food in the Last Year: Never true     Ran Out of Food in the Last Year: Never true   Transportation Needs: Unknown (3/8/2024)    PRAPARE - Transportation     Lack of Transportation (Medical): Not on file     Lack of Transportation (Non-Medical): No   Physical Activity: Not on file   Stress: Not on file   Social

## 2024-05-08 NOTE — PROGRESS NOTES
Brian Bunn is a 28 y.o. year old male who presents today for   Chief Complaint   Patient presents with    Gastroesophageal Reflux    Headache       Is someone accompanying this pt? No     Is the patient using any DME equipment during OV? No     Depression Screening:       3/18/2024     9:49 AM 3/8/2024    11:08 AM   PHQ-9 Questionaire   Little interest or pleasure in doing things 0 0   Feeling down, depressed, or hopeless 0 0   PHQ-9 Total Score 0 0       Abuse Screening:      3/8/2024    11:00 AM   AMB Abuse Screening   Do you ever feel afraid of your partner? N   Are you in a relationship with someone who physically or mentally threatens you? N   Is it safe for you to go home? Y       Learning Assessment:  No question data found.    Fall Risk:       No data to display                    Coordination of Care:   1. \"Have you been to the ER, urgent care clinic since your last visit?  Hospitalized since your last visit?\" No     2. \"Have you seen or consulted any other health care providers outside of the [facility] since your last visit?\" Yes     3. For patients aged 45-75: Has the patient had a colonoscopy / FIT/ Cologuard? Not due     If the patient is female:    4. For patients aged 40-74: Has the patient had a mammogram within the past 2 years? N/A    5. For patients aged 21-65: Has the patient had a pap smear? N/A    Health Maintenance: reviewed and discussed and ordered per Provider.    Health Maintenance Due   Topic Date Due    COVID-19 Vaccine (3 - 2023-24 season) 09/01/2023        -Dione Ignacio LPN  [facility]  Phone: [phone]  Fax: [phone]

## 2024-05-10 ENCOUNTER — HOSPITAL ENCOUNTER (OUTPATIENT)
Facility: HOSPITAL | Age: 29
Setting detail: RECURRING SERIES
Discharge: HOME | End: 2024-05-13
Payer: COMMERCIAL

## 2024-05-10 PROCEDURE — 97112 NEUROMUSCULAR REEDUCATION: CPT

## 2024-05-10 PROCEDURE — 97140 MANUAL THERAPY 1/> REGIONS: CPT

## 2024-05-10 PROCEDURE — G0283 ELEC STIM OTHER THAN WOUND: HCPCS

## 2024-05-10 PROCEDURE — 97110 THERAPEUTIC EXERCISES: CPT

## 2024-05-10 NOTE — PROGRESS NOTES
Physical Therapy Discharge Instructions      [facility]   [address] [address] [phone] fax      Patient: Brian Bunn  : 1995      Continue Home Exercise Program 1 times per day for 6 weeks, then decrease to 4 times per week      Continue with    [x] Ice  as needed     [] Heat           Follow up with MD:     [] Upon completion of therapy     [x] As needed      Recommendations:     [x]   Return to activity with home program    []   Return to activity with the following modifications:       []Post Rehab Program    []Join Independent aquatic program     []Return to/join local gym        Additional Comments: Brian, thank you for your hard work during your therapy sessions and congratulations on your progress! It has been a pleasure working with you. Please reach out in the future if you have any questions.       Benedicto Nunes, PTA 5/10/2024 9:51 AM

## 2024-05-10 NOTE — PROGRESS NOTES
PHYSICAL / OCCUPATIONAL THERAPY - DAILY TREATMENT NOTE    Patient Name: Brian Bunn    Date: 5/10/2024    : 1995  Insurance: Payor: NICHOLAS / Plan: [facility] / Product Type: *No Product type* /      Patient  verified Yes     Visit #   Current / Total 1 1   Time   In / Out 9:00 am 9:52 am   Pain   In / Out 4/10 0/10   Subjective Functional Status/Changes: Patient notes increased soreness from being more active recently. He states he has been walking up and down the stairs repeatedly.     TREATMENT AREA =  Right knee pain [M25.561]    OBJECTIVE  Modalities Rationale:     increase muscle contraction/control to improve patient's ability to progress to PLOF and address remaining functional goals.    10 min [x] Estim Unattended, type/location: NMES to (R) knee in reclined with cueing for active quad set with roled towel under knee  5 seconds on, 10 seconds off                                     [x]  w/ice    []  w/heat    min [] Estim Attended, type/location:                                     []  w/US     []  w/ice    []  w/heat    []  TENS insruct      min []  Mechanical Traction: type/lbs                   []  pro   []  sup   []  int   []  cont    []  before manual    []  after manual    min []  Ultrasound, settings/location:      min  unbill []  Ice     []  Heat    location/position:     min []  Paraffin,  details:     min []  Vasopneumatic Device, press/temp:     min []  Whirlpool / Fluido:    If using vaso (only need to measure limb vaso being performed on)      pre-treatment girth :       post-treatment girth :       measured at (landmark location) :      min []  Other:    Skin assessment post-treatment:   Intact       Therapeutic Procedures:    Tx Min Billable or 1:1 Min (if diff from Tx Min) Procedure, Rationale, Specifics   14  74868 Therapeutic Exercise (timed):  increase ROM, strength, coordination, balance, and proprioception to improve patient's ability to progress to PLOF and address

## 2024-05-17 NOTE — PROGRESS NOTES
[facility]   [address] [place] [address]  Phone: [phone] Fax: [phone]  DISCHARGE SUMMARY  Patient Name: Brian Bunn : 1995   Treatment/Medical Diagnosis: Right knee pain [M25.561]   Referral Source:  Sarabjit Staton MD  Payor: NICHOLAS / Plan: [employer] [place] / Product Type: *No Product type* /      Date of Initial Visit: 24 Attended Visits: 8 Missed Visits: 1     SUMMARY OF TREATMENT  Patient is a 28 year old male who presents with c/o (R) medial knee pain and swelling starting 2024 after dislocation while dancing on 2024. Patient wears brace when in the community. Treatment has consisted of the followin Therapeutic Exercise, 88536 Neuromuscular Re-Education, 09885 Manual Therapy, 43049 Therapeutic Activity, 46460 Self Care/Home Management, 81607 Vasopneumatic Device.    CURRENT STATUS  Patient has attended one sessions since last progress note to improve readiness for discharge to a comprehensive home exercise program, demonstrating ability to complete all interventions without increased pain. Patient feels appropriate for discharge at this time as he states he is able to effectively manage his symptoms via HEP and ice. Assessment from 24 progress note as follows:     FOTO score: 63/100  Subjective improvement of 85% in symptoms since IE reported.  Improvements: improved ability to bend the right knee, able to straighten the right knee fully, moving furniture, stairs, squatting  Deficits: dancing, kneeling (patellar pain), lateral movement, increased time when having to do certain movements such as when standing from a half-kneeling position     ROM:     Hip WNL   Knee 0 - 140 (painful end range flexion)  Ankle WNL      Strength :  Hip: flexion 4+/5, ABD 4/5, extension 4/5  Knee: flexion 4/5, extension 4/5  Right distal thigh circumference 1.2 cm decreased in comparison to left distal thigh

## 2024-11-20 ENCOUNTER — OFFICE VISIT (OUTPATIENT)
Age: 29
End: 2024-11-20
Payer: COMMERCIAL

## 2024-11-20 VITALS
DIASTOLIC BLOOD PRESSURE: 85 MMHG | HEART RATE: 72 BPM | TEMPERATURE: 98.2 F | BODY MASS INDEX: 24.39 KG/M2 | RESPIRATION RATE: 14 BRPM | HEIGHT: 67 IN | OXYGEN SATURATION: 98 % | WEIGHT: 155.4 LBS | SYSTOLIC BLOOD PRESSURE: 143 MMHG

## 2024-11-20 DIAGNOSIS — G44.019 EPISODIC CLUSTER HEADACHE, NOT INTRACTABLE: ICD-10-CM

## 2024-11-20 PROCEDURE — 99213 OFFICE O/P EST LOW 20 MIN: CPT | Performed by: NURSE PRACTITIONER

## 2024-11-20 RX ORDER — GALCANEZUMAB 100 MG/ML
300 INJECTION, SOLUTION SUBCUTANEOUS
Qty: 9 ML | Refills: 11 | Status: SHIPPED | OUTPATIENT
Start: 2024-11-20 | End: 2024-11-24 | Stop reason: SDUPTHER

## 2024-11-20 RX ORDER — CEFUROXIME AXETIL 250 MG/1
6 TABLET ORAL AS NEEDED
Qty: 15 ML | Refills: 6 | Status: SHIPPED | OUTPATIENT
Start: 2024-11-20

## 2024-11-20 NOTE — PROGRESS NOTES
[facility]  [address], [place] [address]  Office:  [phone]  Fax: [phone]  Chief Complaint   Patient presents with    Follow-up       HPI: Brian Bunn presents in follow-up for cluster headaches.  He was last seen here on 5/30/2024 in initial evaluation.  History as below.  Plan was for MRI brain with and without contrast.  He was provided sumatriptan injection to take as needed.  Held off on verapamil due to blood pressure low normal over the last several readings.  He was started on Emgality injections monthly during the cluster period.    He presents today in follow-up.  He is doing well.  Didn't get the medications.  Has not had the return of cluster headaches so far.  It was fall and spring last year, around [place] time to March.  No more since then.  Clusters had been twice a year, then had a 2 year break til last year.  Blood pressure is a little elevated, reports he drank 2 cups of coffee this morning.  He did not have MRI brain.      From previous encounter 5/30/2024:  \"HPI:  Brian Bunn presents in new patient evaluation for cluster headache.  He saw his primary care physician in March to establish care, and reported a sharp pain behind right eye, occurs on 1 side of the face.  Watery eye and congestion on that side.  Comes and goes.  Started 8 years ago.  Headache lasts maximum of 8 hours.  Sometimes daily or every other day.  Aspirin helps somewhat.  Frequency has increased over the years.       He presents today in evaluation.  Onset of the headaches was 8 years ago when he moved here from [place].  When it first started, it would only last about 15-30 min, occurring approximately daily.  Lasted for a month or 2 cycle.  That was for around 3 years.  Would have a cycle in spring and fall.  Can be free from the headaches when not in a cycle.  Really started coming back this year.  HA lasting for 15 min in AM then another in afternoon that

## 2024-11-20 NOTE — PATIENT INSTRUCTIONS
Patient instructions:  -I will send sumatriptan injections to [facility] in [place]. To take as needed for cluster headache.    -I will send the Emgality monthly injections for preventative during the cluster cycle to:  [facility] [place] - [address] - P [phone] - F [phone]     Let me know if issues getting this.

## 2024-11-21 ENCOUNTER — TELEPHONE (OUTPATIENT)
Age: 29
End: 2024-11-21

## 2024-11-21 DIAGNOSIS — G44.019 EPISODIC CLUSTER HEADACHE, NOT INTRACTABLE: ICD-10-CM

## 2024-11-24 RX ORDER — GALCANEZUMAB 100 MG/ML
300 INJECTION, SOLUTION SUBCUTANEOUS
Qty: 9 ML | Refills: 2 | Status: SHIPPED | OUTPATIENT
Start: 2024-11-24

## 2024-11-26 NOTE — TELEPHONE ENCOUNTER
Call placed to patient, name and  verified.  Patient was given message from JAIMIE Clemens 's note (See note)  Patient had no questions OK to send to [facility]

## 2025-01-29 ENCOUNTER — TELEPHONE (OUTPATIENT)
Age: 30
End: 2025-01-29

## 2025-02-06 ENCOUNTER — TELEPHONE (OUTPATIENT)
Age: 30
End: 2025-02-06

## 2025-02-07 ENCOUNTER — OFFICE VISIT (OUTPATIENT)
Facility: CLINIC | Age: 30
End: 2025-02-07
Payer: COMMERCIAL

## 2025-02-07 ENCOUNTER — TELEPHONE (OUTPATIENT)
Age: 30
End: 2025-02-07

## 2025-02-07 VITALS
TEMPERATURE: 98.2 F | RESPIRATION RATE: 16 BRPM | DIASTOLIC BLOOD PRESSURE: 74 MMHG | HEART RATE: 70 BPM | BODY MASS INDEX: 24.61 KG/M2 | WEIGHT: 156.8 LBS | HEIGHT: 67 IN | SYSTOLIC BLOOD PRESSURE: 128 MMHG | OXYGEN SATURATION: 98 %

## 2025-02-07 DIAGNOSIS — Z13.1 DIABETES MELLITUS SCREENING: ICD-10-CM

## 2025-02-07 DIAGNOSIS — G44.009 CLUSTER HEADACHE, NOT INTRACTABLE, UNSPECIFIED CHRONICITY PATTERN: ICD-10-CM

## 2025-02-07 DIAGNOSIS — Z00.00 ANNUAL PHYSICAL EXAM: Primary | ICD-10-CM

## 2025-02-07 DIAGNOSIS — Z13.220 SCREENING CHOLESTEROL LEVEL: ICD-10-CM

## 2025-02-07 DIAGNOSIS — K21.9 GASTROESOPHAGEAL REFLUX DISEASE WITHOUT ESOPHAGITIS: ICD-10-CM

## 2025-02-07 PROCEDURE — 99395 PREV VISIT EST AGE 18-39: CPT | Performed by: STUDENT IN AN ORGANIZED HEALTH CARE EDUCATION/TRAINING PROGRAM

## 2025-02-07 SDOH — ECONOMIC STABILITY: FOOD INSECURITY: WITHIN THE PAST 12 MONTHS, YOU WORRIED THAT YOUR FOOD WOULD RUN OUT BEFORE YOU GOT MONEY TO BUY MORE.: NEVER TRUE

## 2025-02-07 SDOH — ECONOMIC STABILITY: FOOD INSECURITY: WITHIN THE PAST 12 MONTHS, THE FOOD YOU BOUGHT JUST DIDN'T LAST AND YOU DIDN'T HAVE MONEY TO GET MORE.: NEVER TRUE

## 2025-02-07 ASSESSMENT — PATIENT HEALTH QUESTIONNAIRE - PHQ9
SUM OF ALL RESPONSES TO PHQ QUESTIONS 1-9: 0
2. FEELING DOWN, DEPRESSED OR HOPELESS: NOT AT ALL
SUM OF ALL RESPONSES TO PHQ QUESTIONS 1-9: 0
SUM OF ALL RESPONSES TO PHQ QUESTIONS 1-9: 0
1. LITTLE INTEREST OR PLEASURE IN DOING THINGS: NOT AT ALL
SUM OF ALL RESPONSES TO PHQ QUESTIONS 1-9: 0
SUM OF ALL RESPONSES TO PHQ9 QUESTIONS 1 & 2: 0

## 2025-02-07 ASSESSMENT — ENCOUNTER SYMPTOMS
ABDOMINAL PAIN: 0
SHORTNESS OF BREATH: 0

## 2025-02-07 NOTE — PROGRESS NOTES
History of Present Illness  Brian Bunn is a 29 y.o. male who presents today for management of    Chief Complaint   Patient presents with    Gastroesophageal Reflux    Annual Exam         -Patient overall stable since last visit, here for annual physical  -Reports compliance to omeprazole and famotidine  -Patient reports that he notices a significant increase in reflux symptoms when he forgets to take omeprazole  -Not currently established with GI, he has never had an EGD  -Follows with neurology for management of cluster headaches, patient reports he has been headache free for several months    There is no problem list on file for this patient.     Past Medical History:   Diagnosis Date    Headache     Irritable bowel syndrome       History reviewed. No pertinent surgical history.   Family History   Problem Relation Age of Onset    Hypertension Father     Epilepsy Brother     Diabetes Maternal Grandfather      Social History     Socioeconomic History    Marital status:      Spouse name: Not on file    Number of children: Not on file    Years of education: Not on file    Highest education level: Not on file   Occupational History    Not on file   Tobacco Use    Smoking status: Never    Smokeless tobacco: Never   Vaping Use    Vaping status: Some Days    Substances: THC    Devices: Disposable   Substance and Sexual Activity    Alcohol use: Yes     Comment: occasionaly    Drug use: Never    Sexual activity: Yes     Partners: Female   Other Topics Concern    Not on file   Social History Narrative    Not on file     Social Determinants of Health     Financial Resource Strain: Low Risk  (3/8/2024)    Overall Financial Resource Strain (CARDIA)     Difficulty of Paying Living Expenses: Not hard at all   Food Insecurity: No Food Insecurity (2/7/2025)    Hunger Vital Sign     Worried About Running Out of Food in the Last Year: Never true     Ran Out of Food in the Last Year: Never true   Transportation Needs: No

## 2025-02-07 NOTE — PROGRESS NOTES
Brian Bunn is a 29 y.o. year old male who presents today for No chief complaint on file.      \"Have you been to the ER, urgent care clinic since your last visit?  Hospitalized since your last visit?\"    No     “Have you seen or consulted any other health care providers outside our system since your last visit?”    No         Click Here for Release of Records Request    - LORENA Mckay [facility]  Phone: [phone]  Fax: [phone]

## 2025-02-11 ENCOUNTER — TELEPHONE (OUTPATIENT)
Age: 30
End: 2025-02-11

## 2025-02-18 ENCOUNTER — HOSPITAL ENCOUNTER (OUTPATIENT)
Facility: HOSPITAL | Age: 30
Setting detail: SPECIMEN
Discharge: HOME | End: 2025-02-21
Payer: COMMERCIAL

## 2025-02-18 DIAGNOSIS — Z00.00 ANNUAL PHYSICAL EXAM: ICD-10-CM

## 2025-02-18 DIAGNOSIS — Z13.220 SCREENING CHOLESTEROL LEVEL: ICD-10-CM

## 2025-02-18 DIAGNOSIS — Z13.1 DIABETES MELLITUS SCREENING: ICD-10-CM

## 2025-02-18 LAB
25(OH)D3 SERPL-MCNC: 12.8 NG/ML (ref 30–100)
ALBUMIN SERPL-MCNC: 4 G/DL (ref 3.4–5)
ALBUMIN/GLOB SERPL: 1.2 (ref 0.8–1.7)
ALP SERPL-CCNC: 60 U/L (ref 45–117)
ALT SERPL-CCNC: 26 U/L (ref 16–61)
ANION GAP SERPL CALC-SCNC: 3 MMOL/L (ref 3–18)
AST SERPL-CCNC: 18 U/L (ref 10–38)
BASOPHILS # BLD: 0.02 K/UL (ref 0–0.1)
BASOPHILS NFR BLD: 0.3 % (ref 0–2)
BILIRUB SERPL-MCNC: 0.7 MG/DL (ref 0.2–1)
BUN SERPL-MCNC: 9 MG/DL (ref 7–18)
BUN/CREAT SERPL: 10 (ref 12–20)
CALCIUM SERPL-MCNC: 9.3 MG/DL (ref 8.5–10.1)
CHLORIDE SERPL-SCNC: 104 MMOL/L (ref 100–111)
CHOLEST SERPL-MCNC: 227 MG/DL
CO2 SERPL-SCNC: 31 MMOL/L (ref 21–32)
CREAT SERPL-MCNC: 0.93 MG/DL (ref 0.6–1.3)
DIFFERENTIAL METHOD BLD: ABNORMAL
EOSINOPHIL # BLD: 0.37 K/UL (ref 0–0.4)
EOSINOPHIL NFR BLD: 5.8 % (ref 0–5)
ERYTHROCYTE [DISTWIDTH] IN BLOOD BY AUTOMATED COUNT: 13.9 % (ref 11.6–14.5)
EST. AVERAGE GLUCOSE BLD GHB EST-MCNC: 100 MG/DL
GLOBULIN SER CALC-MCNC: 3.4 G/DL (ref 2–4)
GLUCOSE SERPL-MCNC: 92 MG/DL (ref 74–99)
HBA1C MFR BLD: 5.1 % (ref 4.2–5.6)
HCT VFR BLD AUTO: 44.7 % (ref 36–48)
HDLC SERPL-MCNC: 92 MG/DL (ref 40–60)
HDLC SERPL: 2.5 (ref 0–5)
HGB BLD-MCNC: 14.8 G/DL (ref 13–16)
IMM GRANULOCYTES # BLD AUTO: 0.01 K/UL (ref 0–0.04)
IMM GRANULOCYTES NFR BLD AUTO: 0.2 % (ref 0–0.5)
LDLC SERPL CALC-MCNC: 119 MG/DL (ref 0–100)
LIPID PANEL: ABNORMAL
LYMPHOCYTES # BLD: 2.8 K/UL (ref 0.9–3.6)
LYMPHOCYTES NFR BLD: 44 % (ref 21–52)
MCH RBC QN AUTO: 31.8 PG (ref 24–34)
MCHC RBC AUTO-ENTMCNC: 33.1 G/DL (ref 31–37)
MCV RBC AUTO: 96.1 FL (ref 78–100)
MONOCYTES # BLD: 0.55 K/UL (ref 0.05–1.2)
MONOCYTES NFR BLD: 8.6 % (ref 3–10)
NEUTS SEG # BLD: 2.61 K/UL (ref 1.8–8)
NEUTS SEG NFR BLD: 41.1 % (ref 40–73)
NRBC # BLD: 0 K/UL (ref 0–0.01)
NRBC BLD-RTO: 0 PER 100 WBC
PLATELET # BLD AUTO: 295 K/UL (ref 135–420)
PMV BLD AUTO: 10.1 FL (ref 9.2–11.8)
POTASSIUM SERPL-SCNC: 4 MMOL/L (ref 3.5–5.5)
PROT SERPL-MCNC: 7.4 G/DL (ref 6.4–8.2)
RBC # BLD AUTO: 4.65 M/UL (ref 4.35–5.65)
SODIUM SERPL-SCNC: 138 MMOL/L (ref 136–145)
TRIGL SERPL-MCNC: 80 MG/DL
VIT B12 SERPL-MCNC: 258 PG/ML (ref 211–911)
VLDLC SERPL CALC-MCNC: 16 MG/DL
WBC # BLD AUTO: 6.4 K/UL (ref 4.6–13.2)

## 2025-02-18 PROCEDURE — 85025 COMPLETE CBC W/AUTO DIFF WBC: CPT

## 2025-02-18 PROCEDURE — 82306 VITAMIN D 25 HYDROXY: CPT

## 2025-02-18 PROCEDURE — 83036 HEMOGLOBIN GLYCOSYLATED A1C: CPT

## 2025-02-18 PROCEDURE — 80061 LIPID PANEL: CPT

## 2025-02-18 PROCEDURE — 36415 COLL VENOUS BLD VENIPUNCTURE: CPT

## 2025-02-18 PROCEDURE — 82607 VITAMIN B-12: CPT

## 2025-02-18 PROCEDURE — 80053 COMPREHEN METABOLIC PANEL: CPT

## 2025-03-05 DIAGNOSIS — K21.9 GASTROESOPHAGEAL REFLUX DISEASE WITHOUT ESOPHAGITIS: ICD-10-CM

## 2025-03-05 RX ORDER — FAMOTIDINE 20 MG/1
20 TABLET, FILM COATED ORAL 2 TIMES DAILY
Qty: 180 TABLET | Refills: 2 | Status: SHIPPED | OUTPATIENT
Start: 2025-03-05

## 2025-03-05 RX ORDER — OMEPRAZOLE 40 MG/1
40 CAPSULE, DELAYED RELEASE ORAL
Qty: 90 CAPSULE | Refills: 2 | Status: SHIPPED | OUTPATIENT
Start: 2025-03-05

## 2025-03-05 NOTE — TELEPHONE ENCOUNTER
Medication(s) requesting:   Requested Prescriptions     Pending Prescriptions Disp Refills    omeprazole (PRILOSEC) 40 MG delayed release capsule [Pharmacy Med Name: OMEPRAZOLE DR 40 MG CAPSULE] 90 capsule 2     Sig: TAKE 1 CAPSULE BY MOUTH EVERY DAY IN THE MORNING BEFORE BREAKFAST    famotidine (PEPCID) 20 MG tablet [Pharmacy Med Name: FAMOTIDINE 20 MG TABLET] 180 tablet 2     Sig: TAKE 1 TABLET BY MOUTH TWICE A DAY       Last office visit:  02/07/25  Next office visit DMA: 8/7/2025

## 2025-03-31 DIAGNOSIS — F41.8 DEPRESSION WITH ANXIETY: Primary | ICD-10-CM

## 2025-05-06 ENCOUNTER — PATIENT MESSAGE (OUTPATIENT)
Age: 30
End: 2025-05-06

## 2025-05-06 DIAGNOSIS — G44.019 EPISODIC CLUSTER HEADACHE, NOT INTRACTABLE: Primary | ICD-10-CM

## 2025-05-09 DIAGNOSIS — G44.019 EPISODIC CLUSTER HEADACHE, NOT INTRACTABLE: ICD-10-CM

## 2025-05-09 RX ORDER — GALCANEZUMAB 100 MG/ML
300 INJECTION, SOLUTION SUBCUTANEOUS
Qty: 9.24 ML | Refills: 1 | Status: SHIPPED | OUTPATIENT
Start: 2025-05-09

## 2025-05-09 RX ORDER — CEFUROXIME AXETIL 250 MG/1
6 TABLET ORAL AS NEEDED
Qty: 15 ML | Refills: 6 | Status: SHIPPED | OUTPATIENT
Start: 2025-05-09